# Patient Record
Sex: MALE | URBAN - METROPOLITAN AREA
[De-identification: names, ages, dates, MRNs, and addresses within clinical notes are randomized per-mention and may not be internally consistent; named-entity substitution may affect disease eponyms.]

---

## 2017-01-06 ENCOUNTER — OFFICE VISIT (OUTPATIENT)
Dept: FAMILY MEDICINE | Facility: CLINIC | Age: 27
End: 2017-01-06
Payer: COMMERCIAL

## 2017-01-06 VITALS
HEIGHT: 75 IN | DIASTOLIC BLOOD PRESSURE: 72 MMHG | BODY MASS INDEX: 23.75 KG/M2 | WEIGHT: 191 LBS | SYSTOLIC BLOOD PRESSURE: 140 MMHG | TEMPERATURE: 97.5 F | HEART RATE: 56 BPM

## 2017-01-06 DIAGNOSIS — J20.9 ACUTE BRONCHITIS, UNSPECIFIED ORGANISM: Primary | ICD-10-CM

## 2017-01-06 PROCEDURE — 99203 OFFICE O/P NEW LOW 30 MIN: CPT | Performed by: INTERNAL MEDICINE

## 2017-01-06 RX ORDER — DEXTROAMPHETAMINE/AMPHETAMINE 10 MG
CAPSULE, EXT RELEASE 24 HR ORAL
Refills: 0 | COMMUNITY
Start: 2016-11-07

## 2017-01-06 RX ORDER — BENZONATATE 200 MG/1
200 CAPSULE ORAL 3 TIMES DAILY PRN
Qty: 21 CAPSULE | Refills: 0 | Status: SHIPPED | OUTPATIENT
Start: 2017-01-06 | End: 2017-01-26

## 2017-01-06 RX ORDER — LAMOTRIGINE 200 MG/1
TABLET ORAL
Refills: 4 | COMMUNITY
Start: 2016-12-26

## 2017-01-06 NOTE — NURSING NOTE
"Chief Complaint   Patient presents with     Sinus Problem     Cough    /79 mmHg  Pulse 56  Temp(Src) 97.5  F (36.4  C) (Tympanic)  Ht 6' 3\" (1.905 m)  Wt 191 lb (86.637 kg)  BMI 23.87 kg/m2 Body Mass Index is Body mass index is 23.87 kg/(m^2).  BP completed using cuff size : regular right arm  Veronica Monroy MA          "

## 2017-01-06 NOTE — PROGRESS NOTES
"  SUBJECTIVE:                                                    Tristin Snow is a 26 year old male who presents to clinic today for the following health issues:      Acute Illness   Acute illness concerns: cough, sinus  Onset: for about two weeks     Fever: no    Chills/Sweats: no    Headache (location?): YES    Sinus Pressure: no    Conjunctivitis:  no    Ear Pain: no    Rhinorrhea: no    Congestion: no    Sore Throat: no     Cough: YES    Wheeze: no    Decreased Appetite: no    Nausea: no    Vomiting: no    Diarrhea:  no    Dysuria/Freq.: no    Fatigue/Achiness: no    Sick/Strep Exposure: no     Therapies Tried and outcome:     Tristin started off with a cold a couple weeks ago, still having cough and chest congestion now two weeks out.  He has mild SOB, the cough is not productive.  Has tried cough suppressants which are not really helping.  The cough at night is getting better.     Past Medical History   Diagnosis Date     Depressive disorder      ADHD (attention deficit hyperactivity disorder)      Current Outpatient Prescriptions   Medication     lamoTRIgine (LAMICTAL) 200 MG tablet     ADDERALL XR 10 MG per 24 hr capsule     benzonatate (TESSALON) 200 MG capsule     No current facility-administered medications for this visit.     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 No smoking, occasional alcohol use.     ROS:  Constitutional, HEENT, cardiovascular, pulmonary, gi and gu systems are negative, except as otherwise noted.    OBJECTIVE:                                                    /72 mmHg  Pulse 56  Temp(Src) 97.5  F (36.4  C) (Tympanic)  Ht 6' 3\" (1.905 m)  Wt 191 lb (86.637 kg)  BMI 23.87 kg/m2  Body mass index is 23.87 kg/(m^2).    Gen: well appearing, pleasant young man, no distress  HEENT: PERRL, no conjunctival injection, no posterior pharynx erythema, MMM.  TM normal b/l.  No sinus tenderness.   Neck: supple, no LAD  Pulm: breathing comfortably, CTAB, no wheezes or rales  CV: " RRR, normal S1 and S2, no murmurs         ASSESSMENT/PLAN:                                                      1. Acute bronchitis, unspecified organism  Reviewed natural history of bronchitis and symptomatic care   - benzonatate (TESSALON) 200 MG capsule; Take 1 capsule (200 mg) by mouth 3 times daily as needed for cough  Dispense: 21 capsule; Refill: 0    Recommended he monitor his blood pressure at grocery store, etc.  Should follow up if continues to be elevated.     F/U as needed for persistent or worsening symptoms.       Suzie Boyd MD  [facility] [place]

## 2017-01-26 ENCOUNTER — OFFICE VISIT (OUTPATIENT)
Dept: FAMILY MEDICINE | Facility: CLINIC | Age: 27
End: 2017-01-26
Payer: COMMERCIAL

## 2017-01-26 VITALS
WEIGHT: 191 LBS | OXYGEN SATURATION: 99 % | BODY MASS INDEX: 23.75 KG/M2 | HEART RATE: 81 BPM | TEMPERATURE: 98.6 F | HEIGHT: 75 IN | SYSTOLIC BLOOD PRESSURE: 118 MMHG | DIASTOLIC BLOOD PRESSURE: 70 MMHG

## 2017-01-26 DIAGNOSIS — H65.191 ACUTE MUCOID OTITIS MEDIA OF RIGHT EAR: Primary | ICD-10-CM

## 2017-01-26 PROCEDURE — 99213 OFFICE O/P EST LOW 20 MIN: CPT | Performed by: FAMILY MEDICINE

## 2017-01-26 RX ORDER — LORATADINE 10 MG/1
TABLET ORAL
COMMUNITY
Start: 2010-01-27

## 2017-01-26 NOTE — NURSING NOTE
"Chief Complaint   Patient presents with     Sinus Problem       Initial /70 mmHg  Pulse 81  Temp(Src) 98.6  F (37  C) (Tympanic)  Ht 6' 3\" (1.905 m)  Wt 191 lb (86.637 kg)  BMI 23.87 kg/m2  SpO2 99% Estimated body mass index is 23.87 kg/(m^2) as calculated from the following:    Height as of this encounter: 6' 3\" (1.905 m).    Weight as of this encounter: 191 lb (86.637 kg)..  BP completed using cuff size: regular Sarah Severson, CMA  "

## 2017-01-26 NOTE — MR AVS SNAPSHOT
"              After Visit Summary   2017    Tristin Snow    MRN: [id number]           Patient Information     Date Of Birth          1990        Visit Information        Provider Department      2017 8:40 AM David Lam MD [facility] [place]        Today's Diagnoses     Acute mucoid otitis media of right ear    -  1        Follow-ups after your visit        Who to contact     If you have questions or need follow up information about today's clinic visit or your schedule please contact [facility] directly at [phone].  Normal or non-critical lab and imaging results will be communicated to you by Mavizonhart, letter or phone within 4 business days after the clinic has received the results. If you do not hear from us within 7 days, please contact the clinic through Mavizonhart or phone. If you have a critical or abnormal lab result, we will notify you by phone as soon as possible.  Submit refill requests through Zentric or call your pharmacy and they will forward the refill request to us. Please allow 3 business days for your refill to be completed.          Additional Information About Your Visit        MyChart Information     Zentric lets you send messages to your doctor, view your test results, renew your prescriptions, schedule appointments and more. To sign up, go to [email or web address] . Click on \"Log in\" on the left side of the screen, which will take you to the Welcome page. Then click on \"Sign up Now\" on the right side of the page.     You will be asked to enter the access code listed below, as well as some personal information. Please follow the directions to create your username and password.     Your access code is: [email or web address]  Expires: 2017  9:10 AM     Your access code will  in 90 days. If you need help or a new code, please call your [facility] or [phone].        Care EveryWhere ID     This is your Care EveryWhere ID. This could be " "used by other organizations to access your [place] medical records  [id number]        Your Vitals Were     Pulse Temperature Height BMI (Body Mass Index) Pulse Oximetry       81 98.6  F (37  C) (Tympanic) 6' 3\" (1.905 m) 23.87 kg/m2 99%        Blood Pressure from Last 3 Encounters:   01/26/17 118/70   01/06/17 140/72    Weight from Last 3 Encounters:   01/26/17 191 lb (86.637 kg)   01/06/17 191 lb (86.637 kg)              Today, you had the following     No orders found for display         Today's Medication Changes          These changes are accurate as of: 1/26/17  9:10 AM.  If you have any questions, ask your nurse or doctor.               Start taking these medicines.        Dose/Directions    amoxicillin-clavulanate 875-125 MG per tablet   Commonly known as:  AUGMENTIN   Used for:  Acute mucoid otitis media of right ear   Started by:  David aLm MD        Dose:  1 tablet   Take 1 tablet by mouth 2 times daily for 10 days   Quantity:  20 tablet   Refills:  0         Stop taking these medicines if you haven't already. Please contact your care team if you have questions.     benzonatate 200 MG capsule   Commonly known as:  TESSALON   Stopped by:  David Lam MD                Where to get your medicines      These medications were sent to [id number] - [place], [place] - [address] AT [facility]  [address]  [address] [facility], [place] [address]     Phone:  [phone]    - amoxicillin-clavulanate 875-125 MG per tablet             Primary Care Provider    None Specified       No primary provider on file.        Thank you!     Thank you for choosing [facility] [place]  for your care. Our goal is always to provide you with excellent care. Hearing back from our patients is one way we can continue to improve our services. Please take a few minutes to complete the written survey that you may receive in the mail after your visit with us. Thank you!      "        Your Updated Medication List - Protect others around you: Learn how to safely use, store and throw away your medicines at www.disposemymeds.org.          This list is accurate as of: 1/26/17  9:10 AM.  Always use your most recent med list.                   Brand Name Dispense Instructions for use    ADDERALL XR 10 MG per 24 hr capsule   Generic drug:  amphetamine-dextroamphetamine      TK ONE C PO QAM       amoxicillin-clavulanate 875-125 MG per tablet    AUGMENTIN    20 tablet    Take 1 tablet by mouth 2 times daily for 10 days       cholecalciferol 2000 UNITS tablet          CLARITIN 10 MG tablet   Generic drug:  loratadine          FISH OIL PO          lamoTRIgine 200 MG tablet    LaMICtal     TK ONE T PO QD

## 2017-01-26 NOTE — PROGRESS NOTES
"  SUBJECTIVE:                                                    Tristin Snow is a 26 year old male who presents to clinic today for the following health issues:      Acute Illness   Acute illness concerns: Sinus  Onset: 1 week     Fever: no     Chills/Sweats: YES    Headache (location?): YES    Sinus Pressure:YES    Conjunctivitis:  no    Ear Pain: YES: right    Rhinorrhea: YES    Congestion: YES    Sore Throat: YES     Cough: YES    Wheeze: no     Decreased Appetite: no    Nausea: no    Vomiting: no    Diarrhea:  no    Dysuria/Freq.: no    Fatigue/Achiness: YES    Sick/Strep Exposure: no     Therapies Tried and outcome: OTC meds        Problem list and histories reviewed & adjusted, as indicated.  Additional history: as documented    There is no problem list on file for this patient.    No past surgical history on file.    Social History   Substance Use Topics     Smoking status: Never Smoker      Smokeless tobacco: Never Used     Alcohol Use: 0.0 oz/week     0 Standard drinks or equivalent per week      Comment: few times per month      Family History   Problem Relation Age of Onset     Family History Negative           Current Outpatient Prescriptions   Medication Sig Dispense Refill     loratadine (CLARITIN) 10 MG tablet        cholecalciferol 2000 UNITS tablet        Omega-3 Fatty Acids (FISH OIL PO)        amoxicillin-clavulanate (AUGMENTIN) 875-125 MG per tablet Take 1 tablet by mouth 2 times daily for 10 days 20 tablet 0     lamoTRIgine (LAMICTAL) 200 MG tablet TK ONE T PO QD  4     ADDERALL XR 10 MG per 24 hr capsule TK ONE C PO QAM  0     No Known Allergies    ROS:  INTEGUMENTARY/SKIN: NEGATIVE for worrisome rashes, moles or lesions  GI: NEGATIVE for nausea, abdominal pain, heartburn, or change in bowel habits    OBJECTIVE:                                                    /70 mmHg  Pulse 81  Temp(Src) 98.6  F (37  C) (Tympanic)  Ht 6' 3\" (1.905 m)  Wt 191 lb (86.637 kg)  BMI 23.87 kg/m2  " SpO2 99%  Body mass index is 23.87 kg/(m^2).   GENERAL: healthy, alert, well nourished, well hydrated, no distress  HENT: ear canals- normal; TM on the right with opacity and erythema.  Mild right maxillary sinus tenderness noted. Nose- normal; Mouth- no ulcers, no lesions  NECK: no tenderness, no adenopathy, no asymmetry, no masses, no stiffness; thyroid- normal to palpation  RESP: lungs clear to auscultation - no rales, no rhonchi, no wheezes  CV: regular rates and rhythm, normal S1 S2, no S3 or S4 and no murmur, no click or rub -         ASSESSMENT/PLAN:                                                        ICD-10-CM    1. Acute mucoid otitis media of right ear H65.111 amoxicillin-clavulanate (AUGMENTIN) 875-125 MG per tablet     Recommended to start Augmentin for OM of the right side.   Use OTC ibuprofen and sudafed prn.   Stay well hydrated.   Follow up with Provider - if not improving in a few days.      David Lam MD  [facility] [place]